# Patient Record
Sex: MALE | Race: WHITE | NOT HISPANIC OR LATINO | Employment: OTHER | URBAN - NONMETROPOLITAN AREA
[De-identification: names, ages, dates, MRNs, and addresses within clinical notes are randomized per-mention and may not be internally consistent; named-entity substitution may affect disease eponyms.]

---

## 2017-02-16 ENCOUNTER — TRANSCRIBE ORDERS (OUTPATIENT)
Dept: CARDIOLOGY | Facility: CLINIC | Age: 57
End: 2017-02-16

## 2017-02-16 DIAGNOSIS — Z71.9 ENCOUNTER FOR CONSULTATION: Primary | ICD-10-CM

## 2017-03-14 ENCOUNTER — CONSULT (OUTPATIENT)
Dept: CARDIOLOGY | Facility: CLINIC | Age: 57
End: 2017-03-14

## 2017-03-14 VITALS
HEIGHT: 74 IN | WEIGHT: 234 LBS | BODY MASS INDEX: 30.03 KG/M2 | SYSTOLIC BLOOD PRESSURE: 120 MMHG | DIASTOLIC BLOOD PRESSURE: 80 MMHG | HEART RATE: 72 BPM

## 2017-03-14 DIAGNOSIS — R42 DIZZINESS: ICD-10-CM

## 2017-03-14 DIAGNOSIS — R94.31 ABNORMAL EKG: ICD-10-CM

## 2017-03-14 DIAGNOSIS — R06.02 SHORTNESS OF BREATH: ICD-10-CM

## 2017-03-14 DIAGNOSIS — R01.1 MURMUR, CARDIAC: ICD-10-CM

## 2017-03-14 DIAGNOSIS — E11.9 TYPE 2 DIABETES MELLITUS WITHOUT COMPLICATION, WITHOUT LONG-TERM CURRENT USE OF INSULIN (HCC): Primary | ICD-10-CM

## 2017-03-14 DIAGNOSIS — R07.89 CHEST TIGHTNESS: ICD-10-CM

## 2017-03-14 PROCEDURE — 93000 ELECTROCARDIOGRAM COMPLETE: CPT | Performed by: INTERNAL MEDICINE

## 2017-03-14 PROCEDURE — 99204 OFFICE O/P NEW MOD 45 MIN: CPT | Performed by: INTERNAL MEDICINE

## 2017-03-14 RX ORDER — ASPIRIN 81 MG/1
81 TABLET ORAL DAILY
COMMUNITY

## 2017-03-14 RX ORDER — LISINOPRIL 5 MG/1
5 TABLET ORAL DAILY
COMMUNITY

## 2017-03-14 RX ORDER — GLYBURIDE 5 MG/1
5 TABLET ORAL 2 TIMES DAILY WITH MEALS
COMMUNITY

## 2017-03-14 NOTE — PROGRESS NOTES
CARDIAC COMPLAINTS  Dizziness and fatigue      Subjective   Duc Mario is a 57 y.o. male came in today for his initial cardiac evaluation.  He has history of diabetes being managed with low-dose of diabetic medication and also use of ace inhibitors as renal protection.  He apparently had an episode about a month ago.  He came back from work, became very nauseated and vomited.  He went to bed and when he woke up felt very funny dizzy and unable to see an think properly.  He was seen at [facility] emergency room and the workup apparently was negative.  His carotid ultrasound was normal with no significant lesion.  He also had an MRI, the details of is not available to me.  He denies having any chest pain, but does have some tightness in his neck and upper part of the chest.  He does have some occasional palpitation.  He has been having shortness of breath on exertion, but he think it is since something which is chronic due to some kind of primary lung problem which he got in his childhood.     Past Surgical History   Procedure Laterality Date   • Us carotid unilateral  02/21/2017     < 40 %       Current Outpatient Prescriptions   Medication Sig Dispense Refill   • aspirin 81 MG EC tablet Take 81 mg by mouth Daily.     • glyBURIDE (DIAbeta) 5 MG tablet Take 5 mg by mouth Daily With Breakfast.     • lisinopril (PRINIVIL,ZESTRIL) 5 MG tablet Take 5 mg by mouth Daily.       No current facility-administered medications for this visit.            ALLERGIES:  Review of patient's allergies indicates no known allergies.    Past Medical History   Diagnosis Date   • Diabetes mellitus    • GERD (gastroesophageal reflux disease)        History   Smoking Status   • Never Smoker   Smokeless Tobacco   • Never Used        Review of Systems   Constitutional: Positive for fatigue. Negative for activity change.   HENT: Negative for congestion.    Respiratory: Positive for shortness of breath. Negative for chest  "tightness.    Cardiovascular: Negative for chest pain.   Gastrointestinal: Positive for nausea and vomiting. Negative for abdominal distention.   Endocrine: Negative for cold intolerance.   Genitourinary: Negative for difficulty urinating.   Musculoskeletal: Positive for arthralgias.   Skin: Negative for color change.   Allergic/Immunologic: Negative for environmental allergies.   Neurological: Negative for dizziness.   Hematological: Negative for adenopathy.   Psychiatric/Behavioral: Negative for agitation.       Diabetes- Yes  Thyroid- normal    Objective     Visit Vitals   • /80 (BP Location: Left arm)   • Pulse 72   • Ht 74\" (188 cm)   • Wt 234 lb (106 kg)   • BMI 30.04 kg/m2       Physical Exam   Constitutional: He appears well-developed.   HENT:   Head: Normocephalic.   Eyes: Pupils are equal, round, and reactive to light.   Neck: Normal range of motion.   Cardiovascular: Normal rate.    Murmur heard.  Pulmonary/Chest: Effort normal.   Abdominal: Soft.   Musculoskeletal: Normal range of motion.   Neurological: He is alert.   Skin: Skin is warm.   Psychiatric: He has a normal mood and affect.         ECG 12 Lead  Date/Time: 3/14/2017 12:27 PM  Performed by: AGAPITO WHITE  Authorized by: AGAPITO WHITE   Previous ECG: no previous ECG available  Rhythm: sinus rhythm  Rate: normal  QRS axis: normal  Clinical impression: non-specific ECG              Assessment/Plan   At baseline his heart rate and blood pressure appears stable.  His EKG showed sinus rhythm with nonspecific ST-T changes.  Some of the symptoms are worrisome for TIA.  I also explained to him about atypical presentation in diabetes.  I scheduled him to undergo an echocardiogram with bubble study to evaluate the LV systolic function, wall motion abnormality and to rule out PFO.  I also scheduled him to undergo a stress test to evaluate his functional status, chronotropic response, arrhythmias and rule out silent ischemia.  He is " advised to continue his aspirin.  Based on the results of these test, further recommendations will be made.  Duc was seen today for establish care, palpitations and shortness of breath.    Diagnoses and all orders for this visit:    Type 2 diabetes mellitus without complication, without long-term current use of insulin    Dizziness  -     Adult Transthoracic Echo Complete With Contrast; Future    Shortness of breath  -     Adult Transthoracic Echo Complete With Contrast; Future    Murmur, cardiac  -     Adult Transthoracic Echo Complete With Contrast; Future    Abnormal EKG  -     Stress Test With Myocardial Perfusion One Day; Future    Chest tightness  -     Stress Test With Myocardial Perfusion One Day; Future                    Electronically signed by Betzaida Rao MD March 14, 2017 12:24 PM

## 2017-03-21 ENCOUNTER — OUTSIDE FACILITY SERVICE (OUTPATIENT)
Dept: CARDIOLOGY | Facility: CLINIC | Age: 57
End: 2017-03-21

## 2017-03-21 ENCOUNTER — HOSPITAL ENCOUNTER (OUTPATIENT)
Dept: CARDIOLOGY | Facility: HOSPITAL | Age: 57
Discharge: HOME | End: 2017-03-21
Attending: INTERNAL MEDICINE

## 2017-03-21 DIAGNOSIS — R06.02 SHORTNESS OF BREATH: ICD-10-CM

## 2017-03-21 DIAGNOSIS — R01.1 MURMUR, CARDIAC: ICD-10-CM

## 2017-03-21 DIAGNOSIS — R07.89 CHEST TIGHTNESS: ICD-10-CM

## 2017-03-21 DIAGNOSIS — R42 DIZZINESS: ICD-10-CM

## 2017-03-21 DIAGNOSIS — R94.31 ABNORMAL EKG: ICD-10-CM

## 2017-03-21 LAB
MAXIMAL PREDICTED HEART RATE: 162 BPM
STRESS TARGET HR: 138 BPM

## 2017-03-21 PROCEDURE — 25010000002 REGADENOSON 0.4 MG/5ML SOLUTION

## 2017-03-21 PROCEDURE — 93306 TTE W/DOPPLER COMPLETE: CPT | Performed by: INTERNAL MEDICINE

## 2017-03-21 PROCEDURE — 78452 HT MUSCLE IMAGE SPECT MULT: CPT | Performed by: INTERNAL MEDICINE

## 2017-03-21 PROCEDURE — 93018 CV STRESS TEST I&R ONLY: CPT | Performed by: INTERNAL MEDICINE

## 2017-03-21 PROCEDURE — C8929 TTE W OR WO FOL WCON,DOPPLER: HCPCS

## 2017-03-21 PROCEDURE — A9500 TC99M SESTAMIBI: HCPCS | Performed by: INTERNAL MEDICINE

## 2017-03-21 PROCEDURE — 93017 CV STRESS TEST TRACING ONLY: CPT

## 2017-03-21 PROCEDURE — 78452 HT MUSCLE IMAGE SPECT MULT: CPT

## 2017-03-21 PROCEDURE — 0 TECHNETIUM SESTAMIBI: Performed by: INTERNAL MEDICINE

## 2017-03-21 RX ORDER — 0.9 % SODIUM CHLORIDE 0.9 %
10 VIAL (ML) INJECTION EVERY 12 HOURS SCHEDULED
Status: DISCONTINUED | OUTPATIENT
Start: 2017-03-21 | End: 2017-03-23 | Stop reason: HOSPADM

## 2017-03-21 RX ADMIN — Medication 1 DOSE: at 08:30

## 2017-03-21 RX ADMIN — SODIUM CHLORIDE 10 ML: 9 INJECTION, SOLUTION INTRAMUSCULAR; INTRAVENOUS; SUBCUTANEOUS at 07:52

## 2017-03-23 ENCOUNTER — TELEPHONE (OUTPATIENT)
Dept: CARDIOLOGY | Facility: CLINIC | Age: 57
End: 2017-03-23

## 2017-03-23 RX ORDER — CARVEDILOL 3.12 MG/1
3.12 TABLET ORAL 2 TIMES DAILY
Qty: 180 TABLET | Refills: 2 | Status: SHIPPED | OUTPATIENT
Start: 2017-03-23 | End: 2017-09-20 | Stop reason: SDUPTHER

## 2017-03-23 NOTE — TELEPHONE ENCOUNTER
Patients wife Tete made aware of echo with bubble study results and stress test results with recommendations to add Coreg 3.125mg twice daily, Tete verbalized understanding. Script for Coreg 3.125mg bid sent to pharmacy.

## 2017-09-20 ENCOUNTER — OFFICE VISIT (OUTPATIENT)
Dept: CARDIOLOGY | Facility: CLINIC | Age: 57
End: 2017-09-20

## 2017-09-20 VITALS
WEIGHT: 237 LBS | HEART RATE: 64 BPM | DIASTOLIC BLOOD PRESSURE: 82 MMHG | BODY MASS INDEX: 30.42 KG/M2 | HEIGHT: 74 IN | SYSTOLIC BLOOD PRESSURE: 132 MMHG

## 2017-09-20 DIAGNOSIS — E11.9 TYPE 2 DIABETES MELLITUS WITHOUT COMPLICATION, WITHOUT LONG-TERM CURRENT USE OF INSULIN (HCC): ICD-10-CM

## 2017-09-20 DIAGNOSIS — I10 ESSENTIAL HYPERTENSION: Primary | ICD-10-CM

## 2017-09-20 DIAGNOSIS — K21.9 GASTROESOPHAGEAL REFLUX DISEASE WITHOUT ESOPHAGITIS: ICD-10-CM

## 2017-09-20 DIAGNOSIS — R06.02 SHORTNESS OF BREATH: ICD-10-CM

## 2017-09-20 PROCEDURE — 99213 OFFICE O/P EST LOW 20 MIN: CPT | Performed by: NURSE PRACTITIONER

## 2017-09-20 RX ORDER — PANTOPRAZOLE SODIUM 40 MG/1
40 TABLET, DELAYED RELEASE ORAL DAILY
COMMUNITY

## 2017-09-20 RX ORDER — CEFPROZIL 500 MG/1
500 TABLET, FILM COATED ORAL 2 TIMES DAILY
COMMUNITY

## 2017-09-20 RX ORDER — CARVEDILOL 3.12 MG/1
3.12 TABLET ORAL DAILY
Qty: 90 TABLET | Refills: 1 | Status: SHIPPED | OUTPATIENT
Start: 2017-09-20

## 2017-09-20 NOTE — PROGRESS NOTES
Chief Complaint   Patient presents with   • Follow-up     For cardiac management.   • Shortness of Breath     He states having increase in shortness of breath, PCP started Cefprozil 500mg bid. He states was lying on stomach and felt rib side pop and has soreness in right side to back area.   • Dizziness     He states was having severe dizziness after starting Coreg, he reports that he is now only taking Coreg 3.125mg in am, due to low B/P.   • Palpitations     he states about the same.   • Med Refill     Needs refills on Coreg-90 day.       Cardiac Complaints  none      Subjective   Duc Mario is a 57 y.o. male with HTN and diabetes being managed with low-dose of diabetic medication and use of ace inhibitors as renal protection. He also has some kind of lung problem for which he says he has struggled with since he was a kid. He had gone to ER for dizziness back in the winter of this year for dizziness. His carotid ultrasound was normal with no significant lesion noted. Cardiac workup done after consult due to chest tightness and shortness of breath showed questionable ischemia and coreg was added.  Septum was well perfused and LV function was reported as normal.  He returns today for follow up and denies any new cardiac concerns. He does have some shortness of breath and popping in his right lung.  He states this happened after laying on his stomach and reaching for something and it popped and is having trouble taking a deep breath.  He states he has seen for PCP in regards and has recently started cefprozil.  He has been on medication for 3 days and has stated it has improved it some but not a great deal.  He does report recently decreasing his coreg because of dizziness.  He states since he has been using only once daily his dizziness has greatly improved.  Labs are done with PCP as well as most refills.          Cardiac History  Past Surgical History:   Procedure Laterality Date   • CARDIOVASCULAR STRESS TEST   03/21/2017    EF 65%, 8 min 1 sec, ? ant ischemia, add coreg   • ECHO - CONVERTED  03/21/2017    EF 55-60%, no ASD, diastolic dysfunction   • US CAROTID UNILATERAL  02/21/2017    < 40 %       Current Outpatient Prescriptions   Medication Sig Dispense Refill   • aspirin 81 MG EC tablet Take 81 mg by mouth Daily.     • carvedilol (COREG) 3.125 MG tablet Take 1 tablet by mouth Daily. 90 tablet 1   • cefprozil (CEFZIL) 500 MG tablet Take 500 mg by mouth 2 (Two) Times a Day.     • glyBURIDE (DIAbeta) 5 MG tablet Take 5 mg by mouth 2 (Two) Times a Day With Meals.     • lisinopril (PRINIVIL,ZESTRIL) 5 MG tablet Take 5 mg by mouth Daily.     • pantoprazole (PROTONIX) 40 MG EC tablet Take 40 mg by mouth Daily.       No current facility-administered medications for this visit.        Review of patient's allergies indicates no known allergies.    Past Medical History:   Diagnosis Date   • Diabetes mellitus    • GERD (gastroesophageal reflux disease)        Social History     Social History   • Marital status:      Spouse name: N/A   • Number of children: N/A   • Years of education: N/A     Occupational History   • Not on file.     Social History Main Topics   • Smoking status: Never Smoker   • Smokeless tobacco: Never Used   • Alcohol use No   • Drug use: No   • Sexual activity: Not on file     Other Topics Concern   • Not on file     Social History Narrative       Family History   Problem Relation Age of Onset   • Heart disease Mother    • Diabetes Mother    • Cancer Father        Review of Systems   Constitution: Negative for weakness and malaise/fatigue.   HENT: Negative for congestion and sore throat.    Cardiovascular: Negative for chest pain, dyspnea on exertion, near-syncope and syncope.   Respiratory: Positive for shortness of breath and wheezing.    Musculoskeletal: Negative for arthritis and back pain.   Gastrointestinal: Negative for anorexia, heartburn and nausea.   Genitourinary: Negative for dysuria,  "hesitancy and nocturia.   Neurological: Negative for dizziness, focal weakness and headaches.   Psychiatric/Behavioral: Negative for altered mental status and depression.       Diabetes YES  Thyroidnormal    Objective     /82 (BP Location: Left arm)  Pulse 64  Ht 74\" (188 cm)  Wt 237 lb (108 kg)  BMI 30.43 kg/m2    Physical Exam   Constitutional: He is oriented to person, place, and time. He appears well-developed and well-nourished.   HENT:   Head: Normocephalic and atraumatic.   Eyes: EOM are normal. Pupils are equal, round, and reactive to light.   Neck: Normal range of motion. Neck supple.   Cardiovascular: Normal rate and regular rhythm.    Pulmonary/Chest: Effort normal. He has wheezes.   Abdominal: Soft.   Musculoskeletal: Normal range of motion.   Neurological: He is alert and oriented to person, place, and time.   Skin: Skin is warm and dry.   Psychiatric: He has a normal mood and affect. His behavior is normal.       Procedures    Assessment/Plan     HR is stable today.  BP is stable as well.  He does have some issues with his breathing and states this developed after he was working on his stomach and felt a pop in his right side. He has been short of breath since.  He states he is able to work without problems but was advised to call you with concerns as he states it has improved slightly but not a great deal over the last couple days.  No chest xray has been done.  Labs are done with your office, could we get next copy?  No new cardiac workup will be advised today since no new concerns are voiced and most recent workup showed no ischemic burden.  He does state glucose elevated on most recent labs.  Discussed with him limiting his starches/sugars as he states he is not watching diet diet closely. Refills of coreg sent per request.  Activity as tolerated advised.  6 month follow up advised or sooner if needed.      Problems Addressed this Visit        Cardiovascular and Mediastinum    Essential " hypertension - Primary    Relevant Medications    carvedilol (COREG) 3.125 MG tablet       Digestive    Gastroesophageal reflux disease without esophagitis    Relevant Medications    pantoprazole (PROTONIX) 40 MG EC tablet       Endocrine    DM (diabetes mellitus)      Other Visit Diagnoses     Shortness of breath                      Electronically signed by CORY Rankin September 20, 2017 4:30 PM

## 2018-05-17 RX ORDER — CARVEDILOL 3.12 MG/1
TABLET ORAL
Qty: 90 TABLET | OUTPATIENT
Start: 2018-05-17